# Patient Record
Sex: MALE | Race: WHITE | NOT HISPANIC OR LATINO
[De-identification: names, ages, dates, MRNs, and addresses within clinical notes are randomized per-mention and may not be internally consistent; named-entity substitution may affect disease eponyms.]

---

## 2018-11-25 ENCOUNTER — TRANSCRIPTION ENCOUNTER (OUTPATIENT)
Age: 68
End: 2018-11-25

## 2019-02-24 ENCOUNTER — TRANSCRIPTION ENCOUNTER (OUTPATIENT)
Age: 69
End: 2019-02-24

## 2020-06-19 ENCOUNTER — APPOINTMENT (OUTPATIENT)
Dept: MRI IMAGING | Facility: CLINIC | Age: 70
End: 2020-06-19
Payer: MEDICARE

## 2020-06-19 PROCEDURE — A9585: CPT | Mod: JW

## 2020-06-19 PROCEDURE — 73720 MRI LWR EXTREMITY W/O&W/DYE: CPT | Mod: LT

## 2020-06-19 PROCEDURE — A9585A: CUSTOM

## 2020-07-07 ENCOUNTER — OUTPATIENT (OUTPATIENT)
Dept: OUTPATIENT SERVICES | Facility: HOSPITAL | Age: 70
LOS: 1 days | End: 2020-07-07

## 2020-07-10 ENCOUNTER — OUTPATIENT (OUTPATIENT)
Dept: OUTPATIENT SERVICES | Facility: HOSPITAL | Age: 70
LOS: 1 days | End: 2020-07-10

## 2020-08-16 ENCOUNTER — INPATIENT (INPATIENT)
Facility: HOSPITAL | Age: 70
LOS: 2 days | Discharge: HOME | End: 2020-08-19
Admitting: INTERNAL MEDICINE
Payer: MEDICARE

## 2020-08-16 PROCEDURE — 99223 1ST HOSP IP/OBS HIGH 75: CPT | Mod: 25

## 2020-08-16 PROCEDURE — 92941 PRQ TRLML REVSC TOT OCCL AMI: CPT | Mod: LD

## 2020-08-16 PROCEDURE — 93010 ELECTROCARDIOGRAM REPORT: CPT | Mod: 76

## 2020-08-16 PROCEDURE — 71045 X-RAY EXAM CHEST 1 VIEW: CPT | Mod: 26

## 2020-08-16 PROCEDURE — 93458 L HRT ARTERY/VENTRICLE ANGIO: CPT | Mod: 26,XU

## 2020-08-16 PROCEDURE — 99285 EMERGENCY DEPT VISIT HI MDM: CPT

## 2020-08-16 PROCEDURE — 93306 TTE W/DOPPLER COMPLETE: CPT | Mod: 26

## 2020-08-17 PROCEDURE — 99232 SBSQ HOSP IP/OBS MODERATE 35: CPT

## 2020-08-17 PROCEDURE — 93010 ELECTROCARDIOGRAM REPORT: CPT

## 2020-08-18 PROCEDURE — 99233 SBSQ HOSP IP/OBS HIGH 50: CPT

## 2020-08-19 PROCEDURE — 93308 TTE F-UP OR LMTD: CPT | Mod: 26

## 2020-08-21 ENCOUNTER — APPOINTMENT (OUTPATIENT)
Dept: CARDIOLOGY | Facility: CLINIC | Age: 70
End: 2020-08-21
Payer: MEDICARE

## 2020-08-21 ENCOUNTER — NON-APPOINTMENT (OUTPATIENT)
Age: 70
End: 2020-08-21

## 2020-08-21 VITALS
OXYGEN SATURATION: 98 % | HEART RATE: 73 BPM | DIASTOLIC BLOOD PRESSURE: 74 MMHG | HEIGHT: 71 IN | WEIGHT: 215 LBS | SYSTOLIC BLOOD PRESSURE: 110 MMHG | BODY MASS INDEX: 30.1 KG/M2

## 2020-08-21 DIAGNOSIS — Z72.3 LACK OF PHYSICAL EXERCISE: ICD-10-CM

## 2020-08-21 DIAGNOSIS — Z82.49 FAMILY HISTORY OF ISCHEMIC HEART DISEASE AND OTHER DISEASES OF THE CIRCULATORY SYSTEM: ICD-10-CM

## 2020-08-21 DIAGNOSIS — Z80.8 FAMILY HISTORY OF MALIGNANT NEOPLASM OF OTHER ORGANS OR SYSTEMS: ICD-10-CM

## 2020-08-21 DIAGNOSIS — Z78.9 OTHER SPECIFIED HEALTH STATUS: ICD-10-CM

## 2020-08-21 PROCEDURE — 93000 ELECTROCARDIOGRAM COMPLETE: CPT

## 2020-08-21 PROCEDURE — 99214 OFFICE O/P EST MOD 30 MIN: CPT

## 2020-08-27 ENCOUNTER — OUTPATIENT (OUTPATIENT)
Dept: OUTPATIENT SERVICES | Facility: HOSPITAL | Age: 70
LOS: 1 days | End: 2020-08-27

## 2020-08-27 ENCOUNTER — TRANSCRIPTION ENCOUNTER (OUTPATIENT)
Age: 70
End: 2020-08-27

## 2020-09-18 RX ORDER — METOPROLOL TARTRATE 50 MG/1
50 TABLET, FILM COATED ORAL TWICE DAILY
Qty: 90 | Refills: 3 | Status: COMPLETED | COMMUNITY
Start: 2020-08-21 | End: 2020-09-18

## 2020-09-28 ENCOUNTER — APPOINTMENT (OUTPATIENT)
Dept: CARDIOLOGY | Facility: CLINIC | Age: 70
End: 2020-09-28
Payer: MEDICARE

## 2020-09-28 VITALS
HEIGHT: 71 IN | TEMPERATURE: 96.9 F | BODY MASS INDEX: 29.82 KG/M2 | HEART RATE: 58 BPM | OXYGEN SATURATION: 95 % | WEIGHT: 213 LBS | SYSTOLIC BLOOD PRESSURE: 122 MMHG | DIASTOLIC BLOOD PRESSURE: 78 MMHG

## 2020-09-28 PROCEDURE — 99214 OFFICE O/P EST MOD 30 MIN: CPT

## 2020-09-28 RX ORDER — ROSUVASTATIN CALCIUM 20 MG/1
20 TABLET, FILM COATED ORAL DAILY
Qty: 90 | Refills: 3 | Status: DISCONTINUED | COMMUNITY
Start: 2020-08-21 | End: 2020-09-28

## 2020-10-06 ENCOUNTER — OUTPATIENT (OUTPATIENT)
Dept: OUTPATIENT SERVICES | Facility: HOSPITAL | Age: 70
LOS: 1 days | End: 2020-10-06

## 2020-11-17 ENCOUNTER — APPOINTMENT (OUTPATIENT)
Dept: CARDIOLOGY | Facility: CLINIC | Age: 70
End: 2020-11-17
Payer: MEDICARE

## 2020-11-17 PROCEDURE — 93306 TTE W/DOPPLER COMPLETE: CPT

## 2020-12-01 ENCOUNTER — NON-APPOINTMENT (OUTPATIENT)
Age: 70
End: 2020-12-01

## 2020-12-07 ENCOUNTER — APPOINTMENT (OUTPATIENT)
Dept: CARDIOLOGY | Facility: CLINIC | Age: 70
End: 2020-12-07
Payer: MEDICARE

## 2020-12-07 VITALS
HEIGHT: 71 IN | SYSTOLIC BLOOD PRESSURE: 128 MMHG | DIASTOLIC BLOOD PRESSURE: 68 MMHG | TEMPERATURE: 97.1 F | HEART RATE: 57 BPM | OXYGEN SATURATION: 98 % | WEIGHT: 214 LBS | BODY MASS INDEX: 29.96 KG/M2

## 2020-12-07 PROCEDURE — 99214 OFFICE O/P EST MOD 30 MIN: CPT

## 2021-05-20 ENCOUNTER — INPATIENT (INPATIENT)
Facility: HOSPITAL | Age: 71
LOS: 5 days | Discharge: HOME HEALTH | End: 2021-05-26
Payer: MEDICARE

## 2021-05-20 PROCEDURE — 73630 X-RAY EXAM OF FOOT: CPT | Mod: 26,LT

## 2021-05-20 PROCEDURE — 99285 EMERGENCY DEPT VISIT HI MDM: CPT | Mod: CS

## 2021-05-20 PROCEDURE — 73718 MRI LOWER EXTREMITY W/O DYE: CPT | Mod: 26,LT

## 2021-05-21 PROCEDURE — 93925 LOWER EXTREMITY STUDY: CPT | Mod: 26

## 2021-05-22 PROCEDURE — 93971 EXTREMITY STUDY: CPT | Mod: 26,LT

## 2021-05-26 PROCEDURE — 71045 X-RAY EXAM CHEST 1 VIEW: CPT | Mod: 26

## 2021-06-07 ENCOUNTER — APPOINTMENT (OUTPATIENT)
Dept: CARDIOLOGY | Facility: CLINIC | Age: 71
End: 2021-06-07
Payer: MEDICARE

## 2021-06-07 ENCOUNTER — NON-APPOINTMENT (OUTPATIENT)
Age: 71
End: 2021-06-07

## 2021-06-07 VITALS
DIASTOLIC BLOOD PRESSURE: 82 MMHG | HEIGHT: 71 IN | BODY MASS INDEX: 30.94 KG/M2 | TEMPERATURE: 97.1 F | WEIGHT: 221 LBS | OXYGEN SATURATION: 98 % | HEART RATE: 63 BPM | SYSTOLIC BLOOD PRESSURE: 118 MMHG

## 2021-06-07 PROCEDURE — 99214 OFFICE O/P EST MOD 30 MIN: CPT

## 2021-06-07 PROCEDURE — 93000 ELECTROCARDIOGRAM COMPLETE: CPT

## 2021-07-19 ENCOUNTER — RX RENEWAL (OUTPATIENT)
Age: 71
End: 2021-07-19

## 2021-07-29 ENCOUNTER — OUTPATIENT (OUTPATIENT)
Dept: OUTPATIENT SERVICES | Facility: HOSPITAL | Age: 71
LOS: 1 days | End: 2021-07-29

## 2021-08-30 ENCOUNTER — RESULT CHARGE (OUTPATIENT)
Age: 71
End: 2021-08-30

## 2021-08-31 ENCOUNTER — APPOINTMENT (OUTPATIENT)
Dept: CARDIOLOGY | Facility: CLINIC | Age: 71
End: 2021-08-31
Payer: MEDICARE

## 2021-08-31 ENCOUNTER — NON-APPOINTMENT (OUTPATIENT)
Age: 71
End: 2021-08-31

## 2021-08-31 VITALS
HEART RATE: 60 BPM | DIASTOLIC BLOOD PRESSURE: 70 MMHG | HEIGHT: 71 IN | TEMPERATURE: 97.7 F | BODY MASS INDEX: 29.4 KG/M2 | SYSTOLIC BLOOD PRESSURE: 124 MMHG | WEIGHT: 210 LBS | OXYGEN SATURATION: 97 %

## 2021-08-31 DIAGNOSIS — Z01.810 ENCOUNTER FOR PREPROCEDURAL CARDIOVASCULAR EXAMINATION: ICD-10-CM

## 2021-08-31 PROCEDURE — 99214 OFFICE O/P EST MOD 30 MIN: CPT

## 2021-08-31 PROCEDURE — 93000 ELECTROCARDIOGRAM COMPLETE: CPT | Mod: NC

## 2021-08-31 RX ORDER — SILDENAFIL 100 MG/1
100 TABLET, FILM COATED ORAL
Qty: 10 | Refills: 3 | Status: DISCONTINUED | COMMUNITY
Start: 2020-09-28 | End: 2021-08-31

## 2021-08-31 RX ORDER — CLOPIDOGREL 75 MG/1
75 TABLET, FILM COATED ORAL
Qty: 90 | Refills: 3 | Status: DISCONTINUED | COMMUNITY
Start: 2020-08-21 | End: 2021-08-31

## 2021-08-31 NOTE — CARDIOLOGY SUMMARY
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  Incomplete right bundle branch block. [de-identified] : 11/17/2020: Ejection fraction 55-60%, minimal MR, hypokinesis of the distal septum and apex, trace to mild TR, minimal PI, PASP 23 mmHg. [de-identified] : 8/16/2020: STEMI, mid %, PCI x2.

## 2021-08-31 NOTE — REASON FOR VISIT
[Hyperlipidemia] : hyperlipidemia [Hypertension] : hypertension [Coronary Artery Disease] : coronary artery disease [Spouse] : spouse [FreeTextEntry3] : Dr. Liane connelly

## 2021-08-31 NOTE — DISCUSSION/SUMMARY
[Patient] : the patient [With Me] : with me [___ Month(s)] : in [unfilled] month(s) [FreeTextEntry1] : STELLA KONG is a 71 year old M who presents today Aug 31, 2021 with the above history and the following active issues:\par \par \par 1. CAD, acute AWMI 8/2020 - s/p two DEMETRIA to prox and mid LAD. Remain on ASA 81mg daily.\par 2. HTN - controlled, continue lopressor 12.5 mg PO BID and continue lisinopril 5 mg PO daily\par 3. HLD - on crestor 20 mg PO QHS\par 4. Cardiac rehab - graduated\par \par Preoperative cardiovascular examination.\par Patient may proceed with colonoscopy on 9/24/21 with Dr. Garnica at [facility] [place]\par At present, there are no active cardiac conditions. \par No recent unstable coronary syndromes, decompensated heart failure, severe valvular heart disease or significant dysrhythmias.  \par Baseline functional status is acceptable.    \par The clinical benefit of the proposed procedure outweighs the associated cardiovascular risk.  \par Risk not attenuated with further CV testing.  \par Prior testing as outlined above.\par Optimized from a cardiovascular perspective.\par Remain on ASA\par Continue beta blocker\par DVT ppx\par \par Ongoing f/u with PCP.\par \par F/U  with Dr. Montenegro 12/2021\par Discussed red flag symptoms, which would warrant sooner or emergent medical evaluation.\par Any questions and concerns were addressed and resolved.\par \par Sincerely,\par Amrita Leggett FNP-BC\par Patient's history, testing, and plan was reviewed with supervising physician, Dr. Tim Pacheco

## 2021-08-31 NOTE — PHYSICAL EXAM
[Well Developed] : well developed [Well Nourished] : well nourished [No Acute Distress] : no acute distress [Normal Conjunctiva] : normal conjunctiva [Normal Venous Pressure] : normal venous pressure [No Carotid Bruit] : no carotid bruit [Normal S1, S2] : normal S1, S2 [No Murmur] : no murmur [No Rub] : no rub [No Gallop] : no gallop [Clear Lung Fields] : clear lung fields [Good Air Entry] : good air entry [No Respiratory Distress] : no respiratory distress  [Soft] : abdomen soft [Non Tender] : non-tender [No Masses/organomegaly] : no masses/organomegaly [Normal Bowel Sounds] : normal bowel sounds [Abnormal Gait] : abnormal gait [No Edema] : no edema [No Cyanosis] : no cyanosis [No Clubbing] : no clubbing [No Varicosities] : no varicosities [No Rash] : no rash [Moves all extremities] : moves all extremities [No Focal Deficits] : no focal deficits [Normal Speech] : normal speech [Alert and Oriented] : alert and oriented [Normal memory] : normal memory [de-identified] : 2+ PT pulses bilaterally, 1+ right DP pulse. [de-identified] : Right second toe amputation, healing right first toe wound. [de-identified] : As above

## 2021-08-31 NOTE — HISTORY OF PRESENT ILLNESS
[FreeTextEntry1] : STELLA KONG is a 71 year old male with a past medical history of HTN and HLD presented to [facility] [place] on 8/16/20 w/ an acute AWMI.  He was brought emergently to the cath lab where he had two DEMETRIA placed to his prox and mid LAD.  He had an echo with lumason with an estimated EF of 45-50% without thrombus. \par \par 6/7/2021:\par He developed a right great toe blister that proceeded to infection and required hospitalization.  He was found to have osteomyelitis and was started on IV antibiotics. Had PICC and IV ABx which has now been discontinued.  He underwent arterial Dopplers which showed no evidence of significant stenosis.  His last A1c was 5.9%.  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 In the interim there have been no hospitalizations or procedures. Presents today, 8/31/21, for cardiac clearance prior to a routine colonoscopy with Dr. Garnica at [facility] [place] 9/24/21. He has discontinued his Plavix as of 8/25/21, was also off ASA for 4 days by mistake but he has been told he must continue ASA. He denies chest pain, pressure, palpitations, unusual shortness of breath, orthopnea, LE edema, lightheadedness, dizziness, near syncope or syncope. Avid with biking without exertional complaints. Never a smoker.\par \par Testing:\par \par EKG 8/31/21: SB at 54 bpm, PRWP, nonspecific ST-T wave abnormalities, WY interval 180 ms, QTc 392 ms\par \par Labs 6/1/21: Cr 0.95, K 4.2, AST 26, ALT 35, Hgb 15.2, LDL 80, PSA 1.5, CRP 2\par \par U/s 5/22/21: No evidence of LUE DVT. Thrombosis of the left cephalic vein in the prox left forearm extrending in to the anticubital fossa.\par \par Echo 11/17/20: EF 55-60%. Mild MR. Mild segmental LVSF with hypokinesis of the apex and distal septum. Trace to mild TR. Minimal WY.

## 2021-09-24 ENCOUNTER — OUTPATIENT (OUTPATIENT)
Dept: OUTPATIENT SERVICES | Facility: HOSPITAL | Age: 71
LOS: 1 days | End: 2021-09-24
Payer: MEDICARE

## 2021-09-24 PROCEDURE — 73720 MRI LWR EXTREMITY W/O&W/DYE: CPT | Mod: 26,RT

## 2021-10-01 ENCOUNTER — OUTPATIENT (OUTPATIENT)
Dept: OUTPATIENT SERVICES | Facility: HOSPITAL | Age: 71
LOS: 1 days | End: 2021-10-01

## 2021-12-13 ENCOUNTER — APPOINTMENT (OUTPATIENT)
Dept: CARDIOLOGY | Facility: CLINIC | Age: 71
End: 2021-12-13
Payer: MEDICARE

## 2021-12-13 VITALS
DIASTOLIC BLOOD PRESSURE: 78 MMHG | SYSTOLIC BLOOD PRESSURE: 118 MMHG | HEIGHT: 71 IN | WEIGHT: 219 LBS | BODY MASS INDEX: 30.66 KG/M2 | OXYGEN SATURATION: 98 % | HEART RATE: 58 BPM | TEMPERATURE: 97.1 F

## 2021-12-13 PROCEDURE — 99214 OFFICE O/P EST MOD 30 MIN: CPT

## 2022-05-24 ENCOUNTER — OUTPATIENT (OUTPATIENT)
Dept: OUTPATIENT SERVICES | Facility: HOSPITAL | Age: 72
LOS: 1 days | End: 2022-05-24
Payer: MEDICARE

## 2022-05-24 DIAGNOSIS — M79.89 OTHER SPECIFIED SOFT TISSUE DISORDERS: ICD-10-CM

## 2022-05-24 DIAGNOSIS — Z89.422 ACQUIRED ABSENCE OF OTHER LEFT TOE(S): ICD-10-CM

## 2022-05-24 PROCEDURE — 73720 MRI LWR EXTREMITY W/O&W/DYE: CPT | Mod: 26,LT,MA

## 2022-05-25 ENCOUNTER — NON-APPOINTMENT (OUTPATIENT)
Age: 72
End: 2022-05-25

## 2022-06-13 ENCOUNTER — APPOINTMENT (OUTPATIENT)
Dept: CARDIOLOGY | Facility: CLINIC | Age: 72
End: 2022-06-13
Payer: MEDICARE

## 2022-06-13 ENCOUNTER — NON-APPOINTMENT (OUTPATIENT)
Age: 72
End: 2022-06-13

## 2022-06-13 VITALS
WEIGHT: 209 LBS | HEIGHT: 71 IN | BODY MASS INDEX: 29.26 KG/M2 | DIASTOLIC BLOOD PRESSURE: 60 MMHG | OXYGEN SATURATION: 98 % | SYSTOLIC BLOOD PRESSURE: 112 MMHG | HEART RATE: 57 BPM | TEMPERATURE: 97.3 F

## 2022-06-13 VITALS — SYSTOLIC BLOOD PRESSURE: 112 MMHG | DIASTOLIC BLOOD PRESSURE: 72 MMHG

## 2022-06-13 PROCEDURE — 93000 ELECTROCARDIOGRAM COMPLETE: CPT

## 2022-06-13 PROCEDURE — 99214 OFFICE O/P EST MOD 30 MIN: CPT

## 2022-06-13 RX ORDER — CEFUROXIME AXETIL 500 MG/1
500 TABLET ORAL
Qty: 20 | Refills: 0 | Status: DISCONTINUED | COMMUNITY
Start: 2022-05-18

## 2022-06-13 RX ORDER — MUPIROCIN 20 MG/G
2 OINTMENT TOPICAL
Qty: 30 | Refills: 0 | Status: DISCONTINUED | COMMUNITY
Start: 2022-05-19

## 2022-07-07 ENCOUNTER — NON-APPOINTMENT (OUTPATIENT)
Age: 72
End: 2022-07-07

## 2022-10-02 ENCOUNTER — RX RENEWAL (OUTPATIENT)
Age: 72
End: 2022-10-02

## 2022-12-02 ENCOUNTER — APPOINTMENT (OUTPATIENT)
Dept: CARDIOLOGY | Facility: CLINIC | Age: 72
End: 2022-12-02

## 2022-12-02 ENCOUNTER — NON-APPOINTMENT (OUTPATIENT)
Age: 72
End: 2022-12-02

## 2022-12-02 VITALS
DIASTOLIC BLOOD PRESSURE: 80 MMHG | WEIGHT: 203 LBS | HEIGHT: 71 IN | BODY MASS INDEX: 28.42 KG/M2 | SYSTOLIC BLOOD PRESSURE: 118 MMHG | OXYGEN SATURATION: 98 % | TEMPERATURE: 97.1 F | HEART RATE: 55 BPM

## 2022-12-02 PROCEDURE — 93000 ELECTROCARDIOGRAM COMPLETE: CPT

## 2022-12-02 PROCEDURE — 99214 OFFICE O/P EST MOD 30 MIN: CPT

## 2022-12-02 RX ORDER — LEVOFLOXACIN 500 MG/1
500 TABLET, FILM COATED ORAL
Qty: 35 | Refills: 0 | Status: DISCONTINUED | COMMUNITY
Start: 2022-06-02 | End: 2022-12-02

## 2022-12-02 RX ORDER — UBIDECARENONE 200 MG
200 CAPSULE ORAL DAILY
Refills: 0 | Status: ACTIVE | COMMUNITY

## 2023-04-13 RX ORDER — METOPROLOL TARTRATE 25 MG/1
25 TABLET, FILM COATED ORAL TWICE DAILY
Qty: 90 | Refills: 3 | Status: ACTIVE | COMMUNITY
Start: 2020-09-18 | End: 1900-01-01

## 2023-05-25 ENCOUNTER — OFFICE (OUTPATIENT)
Dept: URBAN - METROPOLITAN AREA CLINIC 9 | Facility: CLINIC | Age: 73
Setting detail: OPHTHALMOLOGY
End: 2023-05-25
Payer: MEDICARE

## 2023-05-25 DIAGNOSIS — H02.831: ICD-10-CM

## 2023-05-25 DIAGNOSIS — H25.13: ICD-10-CM

## 2023-05-25 DIAGNOSIS — H35.40: ICD-10-CM

## 2023-05-25 DIAGNOSIS — H35.371: ICD-10-CM

## 2023-05-25 DIAGNOSIS — H52.4: ICD-10-CM

## 2023-05-25 DIAGNOSIS — H40.013: ICD-10-CM

## 2023-05-25 DIAGNOSIS — H02.834: ICD-10-CM

## 2023-05-25 DIAGNOSIS — H02.403: ICD-10-CM

## 2023-05-25 PROCEDURE — 92014 COMPRE OPH EXAM EST PT 1/>: CPT | Performed by: OPHTHALMOLOGY

## 2023-05-25 PROCEDURE — 92134 CPTRZ OPH DX IMG PST SGM RTA: CPT | Performed by: OPHTHALMOLOGY

## 2023-05-25 ASSESSMENT — VISUAL ACUITY
OS_BCVA: 20/20
OD_BCVA: 20/20

## 2023-05-25 ASSESSMENT — REFRACTION_MANIFEST
OS_ADD: +2.25
OS_CYLINDER: +0.25
OS_VA1: 20/20
OD_VA1: 20/20
OD_VA2: 20/25(J1)
OD_ADD: +2.25
OS_SPHERE: +1.25
OD_SPHERE: +1.25
OU_VA: 20/20
OD_AXIS: 170
OS_AXIS: 160
OS_VA2: 20/25(J1)
OD_CYLINDER: +0.50

## 2023-05-25 ASSESSMENT — REFRACTION_CURRENTRX
OD_AXIS: 170
OD_VPRISM_DIRECTION: PROGS
OS_OVR_VA: 20/
OD_ADD: +2.25
OD_SPHERE: +1.25
OS_SPHERE: +1.25
OS_CYLINDER: +0.25
OS_AXIS: 158
OS_ADD: +2.25
OS_VPRISM_DIRECTION: PROGS
OD_OVR_VA: 20/
OD_CYLINDER: +0.50

## 2023-05-25 ASSESSMENT — KERATOMETRY
OD_K1POWER_DIOPTERS: 44.00
METHOD_AUTO_MANUAL: AUTO
OS_K2POWER_DIOPTERS: 45.25
OD_AXISANGLE_DEGREES: 035
OS_AXISANGLE_DEGREES: 090
OS_K1POWER_DIOPTERS: 45.25
OD_K2POWER_DIOPTERS: 44.75

## 2023-05-25 ASSESSMENT — CONFRONTATIONAL VISUAL FIELD TEST (CVF)
OD_FINDINGS: FULL
OS_FINDINGS: FULL

## 2023-05-25 ASSESSMENT — LID POSITION - DERMATOCHALASIS
OS_DERMATOCHALASIS: LUL
OD_DERMATOCHALASIS: RUL

## 2023-05-25 ASSESSMENT — LID POSITION - PTOSIS
OD_PTOSIS: RUL 3+ 4+
OS_PTOSIS: LUL 3+

## 2023-05-25 ASSESSMENT — REFRACTION_AUTOREFRACTION
OD_CYLINDER: +1.25
OS_AXIS: 168
OS_SPHERE: +1.50
OD_AXIS: 013
OD_SPHERE: +1.50
OS_CYLINDER: +1.00

## 2023-05-25 ASSESSMENT — SPHEQUIV_DERIVED
OD_SPHEQUIV: 2.125
OD_SPHEQUIV: 1.5
OS_SPHEQUIV: 1.375
OS_SPHEQUIV: 2

## 2023-05-25 ASSESSMENT — AXIALLENGTH_DERIVED
OS_AL: 22.4695
OD_AL: 22.496
OD_AL: 22.7196
OS_AL: 22.2507

## 2023-06-02 ENCOUNTER — APPOINTMENT (OUTPATIENT)
Dept: CARDIOLOGY | Facility: CLINIC | Age: 73
End: 2023-06-02
Payer: MEDICARE

## 2023-06-02 ENCOUNTER — NON-APPOINTMENT (OUTPATIENT)
Age: 73
End: 2023-06-02

## 2023-06-02 VITALS
DIASTOLIC BLOOD PRESSURE: 66 MMHG | HEIGHT: 71 IN | SYSTOLIC BLOOD PRESSURE: 112 MMHG | HEART RATE: 71 BPM | OXYGEN SATURATION: 97 % | BODY MASS INDEX: 29.12 KG/M2 | WEIGHT: 208 LBS

## 2023-06-02 DIAGNOSIS — I10 ESSENTIAL (PRIMARY) HYPERTENSION: ICD-10-CM

## 2023-06-02 PROCEDURE — 99214 OFFICE O/P EST MOD 30 MIN: CPT

## 2023-06-02 PROCEDURE — 93000 ELECTROCARDIOGRAM COMPLETE: CPT

## 2023-06-02 PROCEDURE — 93306 TTE W/DOPPLER COMPLETE: CPT

## 2023-07-07 ENCOUNTER — RX RENEWAL (OUTPATIENT)
Age: 73
End: 2023-07-07

## 2023-08-24 ENCOUNTER — OFFICE (OUTPATIENT)
Dept: URBAN - METROPOLITAN AREA CLINIC 9 | Facility: CLINIC | Age: 73
Setting detail: OPHTHALMOLOGY
End: 2023-08-24
Payer: MEDICARE

## 2023-08-24 DIAGNOSIS — H01.001: ICD-10-CM

## 2023-08-24 DIAGNOSIS — H35.40: ICD-10-CM

## 2023-08-24 DIAGNOSIS — H40.013: ICD-10-CM

## 2023-08-24 DIAGNOSIS — H01.004: ICD-10-CM

## 2023-08-24 DIAGNOSIS — H25.13: ICD-10-CM

## 2023-08-24 PROBLEM — H02.40 PTOSIS OF EYELID, UNSPECIFIED; BOTH EYES: Status: ACTIVE | Noted: 2023-08-24

## 2023-08-24 PROCEDURE — 92133 CPTRZD OPH DX IMG PST SGM ON: CPT | Performed by: OPHTHALMOLOGY

## 2023-08-24 PROCEDURE — 92083 EXTENDED VISUAL FIELD XM: CPT | Performed by: OPHTHALMOLOGY

## 2023-08-24 PROCEDURE — 99213 OFFICE O/P EST LOW 20 MIN: CPT | Performed by: OPHTHALMOLOGY

## 2023-08-24 ASSESSMENT — SPHEQUIV_DERIVED
OD_SPHEQUIV: 2.125
OS_SPHEQUIV: 2
OS_SPHEQUIV: 1.375
OD_SPHEQUIV: 1.5

## 2023-08-24 ASSESSMENT — LID POSITION - PTOSIS
OD_PTOSIS: RUL 3+ 4+
OS_PTOSIS: LUL 3+

## 2023-08-24 ASSESSMENT — LID POSITION - DERMATOCHALASIS
OS_DERMATOCHALASIS: LUL
OD_DERMATOCHALASIS: RUL

## 2023-08-24 ASSESSMENT — REFRACTION_CURRENTRX
OS_SPHERE: +1.25
OD_VPRISM_DIRECTION: PROGS
OS_ADD: +2.25
OD_ADD: +2.25
OS_OVR_VA: 20/
OS_AXIS: 158
OS_VPRISM_DIRECTION: PROGS
OS_CYLINDER: +0.25
OD_AXIS: 170
OD_OVR_VA: 20/
OD_SPHERE: +1.25
OD_CYLINDER: +0.50

## 2023-08-24 ASSESSMENT — KERATOMETRY
OS_K1POWER_DIOPTERS: 45.25
OS_K2POWER_DIOPTERS: 45.25
METHOD_AUTO_MANUAL: AUTO
OD_AXISANGLE_DEGREES: 035
OD_K1POWER_DIOPTERS: 44.00
OS_AXISANGLE_DEGREES: 090
OD_K2POWER_DIOPTERS: 44.75

## 2023-08-24 ASSESSMENT — LID EXAM ASSESSMENTS
OD_LAGOPHTHALMOS: ABSENT
OD_LEVATOR_FUNCTION: GOOD
OD_BROW_PTOSIS: PRESENT
OS_LAGOPHTHALMOS: ABSENT
OD_MRD1: 2 MM
OS_MRD1: -2 MM
OS_BLEPHARITIS: LUL 2+
OS_BROW_PTOSIS: PRESENT
OD_BLEPHARITIS: RUL 2+
OS_LEVATOR_FUNCTION: GOOD

## 2023-08-24 ASSESSMENT — REFRACTION_MANIFEST
OD_VA1: 20/20
OD_VA2: 20/25(J1)
OS_SPHERE: +1.25
OD_ADD: +2.25
OD_AXIS: 170
OS_VA1: 20/20
OU_VA: 20/20
OS_CYLINDER: +0.25
OS_ADD: +2.25
OD_SPHERE: +1.25
OS_AXIS: 160
OD_CYLINDER: +0.50
OS_VA2: 20/25(J1)

## 2023-08-24 ASSESSMENT — REFRACTION_AUTOREFRACTION
OD_SPHERE: +1.50
OS_SPHERE: +1.50
OD_CYLINDER: +1.25
OS_AXIS: 168
OS_CYLINDER: +1.00
OD_AXIS: 013

## 2023-08-24 ASSESSMENT — AXIALLENGTH_DERIVED
OS_AL: 22.4695
OS_AL: 22.2507
OD_AL: 22.7196
OD_AL: 22.496

## 2023-08-24 ASSESSMENT — VISUAL ACUITY
OD_BCVA: 20/20
OS_BCVA: 20/20

## 2023-08-24 ASSESSMENT — CONFRONTATIONAL VISUAL FIELD TEST (CVF)
OD_FINDINGS: FULL
OS_FINDINGS: FULL

## 2023-10-12 ENCOUNTER — RESULT REVIEW (OUTPATIENT)
Age: 73
End: 2023-10-12

## 2023-12-08 ENCOUNTER — APPOINTMENT (OUTPATIENT)
Dept: CARDIOLOGY | Facility: CLINIC | Age: 73
End: 2023-12-08
Payer: MEDICARE

## 2023-12-08 ENCOUNTER — NON-APPOINTMENT (OUTPATIENT)
Age: 73
End: 2023-12-08

## 2023-12-08 VITALS
OXYGEN SATURATION: 99 % | BODY MASS INDEX: 39.46 KG/M2 | SYSTOLIC BLOOD PRESSURE: 120 MMHG | WEIGHT: 209 LBS | DIASTOLIC BLOOD PRESSURE: 70 MMHG | HEIGHT: 61 IN | HEART RATE: 55 BPM

## 2023-12-08 DIAGNOSIS — I21.09 ST ELEVATION (STEMI) MYOCARDIAL INFARCTION INVOLVING OTHER CORONARY ARTERY OF ANTERIOR WALL: ICD-10-CM

## 2023-12-08 PROCEDURE — 99214 OFFICE O/P EST MOD 30 MIN: CPT

## 2023-12-08 PROCEDURE — 93000 ELECTROCARDIOGRAM COMPLETE: CPT

## 2023-12-08 RX ORDER — ROSUVASTATIN CALCIUM 20 MG/1
20 TABLET, FILM COATED ORAL DAILY
Qty: 90 | Refills: 3 | Status: ACTIVE | COMMUNITY
Start: 2020-09-18 | End: 1900-01-01

## 2023-12-08 RX ORDER — LISINOPRIL 5 MG/1
5 TABLET ORAL
Qty: 90 | Refills: 3 | Status: ACTIVE | COMMUNITY
Start: 2020-08-21 | End: 1900-01-01

## 2024-05-05 DIAGNOSIS — E78.5 HYPERLIPIDEMIA, UNSPECIFIED: ICD-10-CM

## 2024-07-12 ENCOUNTER — APPOINTMENT (OUTPATIENT)
Dept: CARDIOLOGY | Facility: CLINIC | Age: 74
End: 2024-07-12
Payer: MEDICARE

## 2024-07-12 ENCOUNTER — NON-APPOINTMENT (OUTPATIENT)
Age: 74
End: 2024-07-12

## 2024-07-12 VITALS
WEIGHT: 205 LBS | SYSTOLIC BLOOD PRESSURE: 120 MMHG | HEIGHT: 61 IN | BODY MASS INDEX: 38.71 KG/M2 | OXYGEN SATURATION: 96 % | DIASTOLIC BLOOD PRESSURE: 72 MMHG | HEART RATE: 63 BPM

## 2024-07-12 DIAGNOSIS — I21.09 ST ELEVATION (STEMI) MYOCARDIAL INFARCTION INVOLVING OTHER CORONARY ARTERY OF ANTERIOR WALL: ICD-10-CM

## 2024-07-12 DIAGNOSIS — E78.5 HYPERLIPIDEMIA, UNSPECIFIED: ICD-10-CM

## 2024-07-12 LAB — HBA1C MFR BLD HPLC: 6.3

## 2024-07-12 PROCEDURE — G2211 COMPLEX E/M VISIT ADD ON: CPT

## 2024-07-12 PROCEDURE — 99214 OFFICE O/P EST MOD 30 MIN: CPT

## 2024-07-12 PROCEDURE — 93000 ELECTROCARDIOGRAM COMPLETE: CPT

## 2025-02-24 ENCOUNTER — APPOINTMENT (OUTPATIENT)
Dept: CARDIOLOGY | Facility: CLINIC | Age: 75
End: 2025-02-24
Payer: MEDICARE

## 2025-02-24 VITALS
BODY MASS INDEX: 27.72 KG/M2 | HEART RATE: 59 BPM | HEIGHT: 71 IN | WEIGHT: 198 LBS | SYSTOLIC BLOOD PRESSURE: 128 MMHG | DIASTOLIC BLOOD PRESSURE: 78 MMHG | OXYGEN SATURATION: 98 %

## 2025-02-24 DIAGNOSIS — E78.5 HYPERLIPIDEMIA, UNSPECIFIED: ICD-10-CM

## 2025-02-24 DIAGNOSIS — I21.09 ST ELEVATION (STEMI) MYOCARDIAL INFARCTION INVOLVING OTHER CORONARY ARTERY OF ANTERIOR WALL: ICD-10-CM

## 2025-02-24 PROCEDURE — G2211 COMPLEX E/M VISIT ADD ON: CPT

## 2025-02-24 PROCEDURE — 99214 OFFICE O/P EST MOD 30 MIN: CPT

## 2025-02-24 RX ORDER — COVID-19,FLU A,B ANTIGEN TEST
KIT MISCELLANEOUS
Qty: 2 | Refills: 0 | Status: ACTIVE | COMMUNITY
Start: 2025-02-20

## 2025-02-24 RX ORDER — ONDANSETRON 8 MG/1
8 TABLET, ORALLY DISINTEGRATING ORAL
Qty: 15 | Refills: 0 | Status: ACTIVE | COMMUNITY
Start: 2025-02-20

## 2025-03-17 ENCOUNTER — TRANSCRIPTION ENCOUNTER (OUTPATIENT)
Age: 75
End: 2025-03-17

## 2025-07-02 ENCOUNTER — APPOINTMENT (OUTPATIENT)
Dept: OPHTHALMOLOGY | Facility: CLINIC | Age: 75
End: 2025-07-02
Payer: MEDICARE

## 2025-07-02 ENCOUNTER — NON-APPOINTMENT (OUTPATIENT)
Age: 75
End: 2025-07-02

## 2025-07-02 PROCEDURE — 92004 COMPRE OPH EXAM NEW PT 1/>: CPT

## 2025-08-14 ENCOUNTER — APPOINTMENT (OUTPATIENT)
Dept: ULTRASOUND IMAGING | Facility: CLINIC | Age: 75
End: 2025-08-14
Payer: MEDICARE

## 2025-08-14 ENCOUNTER — RESULT CHARGE (OUTPATIENT)
Age: 75
End: 2025-08-14

## 2025-08-14 ENCOUNTER — RESULT REVIEW (OUTPATIENT)
Age: 75
End: 2025-08-14

## 2025-08-14 ENCOUNTER — APPOINTMENT (OUTPATIENT)
Dept: UROLOGY | Facility: CLINIC | Age: 75
End: 2025-08-14
Payer: MEDICARE

## 2025-08-14 VITALS
TEMPERATURE: 97.3 F | SYSTOLIC BLOOD PRESSURE: 131 MMHG | HEART RATE: 65 BPM | HEIGHT: 71 IN | BODY MASS INDEX: 27.72 KG/M2 | DIASTOLIC BLOOD PRESSURE: 76 MMHG | WEIGHT: 198 LBS

## 2025-08-14 DIAGNOSIS — Z12.5 ENCOUNTER FOR SCREENING FOR MALIGNANT NEOPLASM OF PROSTATE: ICD-10-CM

## 2025-08-14 DIAGNOSIS — N13.8 BENIGN PROSTATIC HYPERPLASIA WITH LOWER URINARY TRACT SYMPMS: ICD-10-CM

## 2025-08-14 DIAGNOSIS — N40.1 BENIGN PROSTATIC HYPERPLASIA WITH LOWER URINARY TRACT SYMPMS: ICD-10-CM

## 2025-08-14 DIAGNOSIS — N52.01 ERECTILE DYSFUNCTION DUE TO ARTERIAL INSUFFICIENCY: ICD-10-CM

## 2025-08-14 PROCEDURE — 76857 US EXAM PELVIC LIMITED: CPT

## 2025-08-14 PROCEDURE — 99204 OFFICE O/P NEW MOD 45 MIN: CPT

## 2025-08-14 RX ORDER — SILODOSIN 8 MG/1
8 CAPSULE ORAL
Qty: 30 | Refills: 8 | Status: ACTIVE | COMMUNITY
Start: 2025-08-14 | End: 1900-01-01

## 2025-08-17 LAB
PSA FREE FLD-MCNC: 40 %
PSA FREE SERPL-MCNC: 0.91 NG/ML
PSA SERPL-MCNC: 2.26 NG/ML

## 2025-08-25 ENCOUNTER — APPOINTMENT (OUTPATIENT)
Dept: CARDIOLOGY | Facility: CLINIC | Age: 75
End: 2025-08-25
Payer: MEDICARE

## 2025-08-25 VITALS
WEIGHT: 197 LBS | OXYGEN SATURATION: 96 % | DIASTOLIC BLOOD PRESSURE: 70 MMHG | SYSTOLIC BLOOD PRESSURE: 110 MMHG | HEART RATE: 59 BPM | BODY MASS INDEX: 27.48 KG/M2

## 2025-08-25 DIAGNOSIS — E78.5 HYPERLIPIDEMIA, UNSPECIFIED: ICD-10-CM

## 2025-08-25 DIAGNOSIS — I21.09 ST ELEVATION (STEMI) MYOCARDIAL INFARCTION INVOLVING OTHER CORONARY ARTERY OF ANTERIOR WALL: ICD-10-CM

## 2025-08-25 PROCEDURE — G2211 COMPLEX E/M VISIT ADD ON: CPT

## 2025-08-25 PROCEDURE — 99214 OFFICE O/P EST MOD 30 MIN: CPT

## 2025-08-25 PROCEDURE — 93000 ELECTROCARDIOGRAM COMPLETE: CPT

## 2025-09-18 ENCOUNTER — APPOINTMENT (OUTPATIENT)
Dept: UROLOGY | Facility: CLINIC | Age: 75
End: 2025-09-18
Payer: MEDICARE

## 2025-09-18 VITALS
WEIGHT: 197 LBS | DIASTOLIC BLOOD PRESSURE: 71 MMHG | HEIGHT: 71 IN | SYSTOLIC BLOOD PRESSURE: 136 MMHG | BODY MASS INDEX: 27.58 KG/M2 | TEMPERATURE: 98.2 F | HEART RATE: 65 BPM

## 2025-09-18 DIAGNOSIS — N13.8 BENIGN PROSTATIC HYPERPLASIA WITH LOWER URINARY TRACT SYMPMS: ICD-10-CM

## 2025-09-18 DIAGNOSIS — N40.1 BENIGN PROSTATIC HYPERPLASIA WITH LOWER URINARY TRACT SYMPMS: ICD-10-CM

## 2025-09-18 PROCEDURE — 99213 OFFICE O/P EST LOW 20 MIN: CPT

## 2025-09-19 ENCOUNTER — RX RENEWAL (OUTPATIENT)
Age: 75
End: 2025-09-19